# Patient Record
Sex: FEMALE | Race: ASIAN | NOT HISPANIC OR LATINO | URBAN - METROPOLITAN AREA
[De-identification: names, ages, dates, MRNs, and addresses within clinical notes are randomized per-mention and may not be internally consistent; named-entity substitution may affect disease eponyms.]

---

## 2021-09-08 ENCOUNTER — OFFICE VISIT - RIVER FALLS (OUTPATIENT)
Dept: FAMILY MEDICINE | Facility: CLINIC | Age: 26
End: 2021-09-08

## 2021-09-08 ASSESSMENT — MIFFLIN-ST. JEOR: SCORE: 1290.66

## 2022-01-13 ENCOUNTER — OFFICE VISIT - RIVER FALLS (OUTPATIENT)
Dept: FAMILY MEDICINE | Facility: CLINIC | Age: 27
End: 2022-01-13

## 2022-01-19 ENCOUNTER — COMMUNICATION - RIVER FALLS (OUTPATIENT)
Dept: FAMILY MEDICINE | Facility: CLINIC | Age: 27
End: 2022-01-19

## 2022-01-27 ENCOUNTER — COMMUNICATION - RIVER FALLS (OUTPATIENT)
Dept: FAMILY MEDICINE | Facility: CLINIC | Age: 27
End: 2022-01-27

## 2022-02-11 VITALS
SYSTOLIC BLOOD PRESSURE: 103 MMHG | BODY MASS INDEX: 22.32 KG/M2 | HEART RATE: 78 BPM | WEIGHT: 126 LBS | DIASTOLIC BLOOD PRESSURE: 68 MMHG | HEIGHT: 63 IN

## 2022-02-16 NOTE — NURSING NOTE
Depression Screening Entered On:  11/5/2021 2:24 PM CDT    Performed On:  9/8/2021 2:24 PM CDT by Rupal Freedman               Depression Screening   Little Interest - Pleasure in Activities :   Several days   Feeling Down, Depressed, Hopeless :   Several days   Initial Depression Screen Score :   2 Score   Poor Appetite or Overeating :   Not at all   Trouble Falling or Staying Asleep :   Several days   Feeling Tired or Little Energy :   Not at all   Feeling Bad About Yourself :   Not at all   Trouble Concentrating :   Not at all   Moving or Speaking Slowly :   Not at all   Thoughts Better Off Dead or Hurting Self :   Not at all   Difficulty at Work, Home, Getting Along :   Somewhat difficult   Detailed Depression Screen Score :   1    Total Depression Screen Score :   3    Rupal Freedman - 11/5/2021 2:24 PM CDT

## 2022-02-16 NOTE — PROGRESS NOTES
Patient:   SKY TUBBS            MRN: [id number]            FIN: [id number]               Age:   25 years     Sex:  Female     :  1995   Associated Diagnoses:   Skin rash; Foot pain   Author:   Quinton Jordan PA-C      Visit Information      Date of Service: 2021 01:52 pm  Performing Location: [facility]  Encounter#: [id number]      Primary Care Provider (PCP):  NONE ,       Referring Provider:  Quinton Jordan PA-C    NPI# [id number]   Visit type:  General concerns.    History limitation:  None.       Chief Complaint   2021 2:16 PM CDT     New Patient: Eczema on hands, face and scalp, foot pain        History of Present Illness             The patient presents with rash.  The location of the rash is bilaterally on the, scalp, face, arms, finger, feet.  The rash is described as scaly and itching.  The severity of the symptom(s) associated to the rash is mild.  The timing/ course of symptom(s) related to the rash is intermittent.  The rash has lasted for Dx with eczema. Using triamcinolone and moisturizers. Having a flare. Bilateral foot pain. Hurts on the balls of feet. Better now that she is wearing shoes. No injury. History of plantar fasciitis..        Review of Systems   Constitutional:  Negative.    Eye:  Negative.    Ear/Nose/Mouth/Throat:  Negative.    Respiratory:  Negative.    Cardiovascular:  Negative.    Gastrointestinal:  Negative.    Genitourinary:  Negative.    Hematology/Lymphatics:  Negative.    Endocrine:  Negative.    Immunologic:  Negative.    Musculoskeletal:  Negative except as documented in history of present illness.    Integumentary:  Negative except as documented in history of present illness.    Neurologic:  Negative.       Health Status   Allergies:    Allergic Reactions (Selected)  Severity Not Documented  Penicillin (No reactions were documented)   Medications:  (Selected)   Prescriptions  Prescribed  fluocinolone 0.01% topical solution: See Instructions,  Instructions: Topical tid for dry scalp, # 60 mL, 1 Refill(s), Type: Maintenance, Pharmacy: [facility], Topical tid for dry scalp, 63, in, 09/08/21 14:16:00 CDT, Height Measured, 126, lb, 09/08/21 14:16:00 CD...  fluocinolone 0.025% topical ointment: 1 esme, Topical, bid, # 30 gm, 0 Refill(s), Type: Maintenance, Pharmacy: [facility], For eczema. Not for use on face, 1 esme Topical bid, 63, in, 09/08/21 14:16:00 CDT, Height Measured, 126, lb, 09/08/21 14:16:00 CDT, Weight Ester...  hydrocortisone 2.5% topical cream: 1 esme, Topical, bid, # 20 gm, 0 Refill(s), Type: Maintenance, Pharmacy: [facility], For use on face, 1 esme Topical bid, 63, in, 09/08/21 14:16:00 CDT, Height Measured, 126, lb, 09/08/21 14:16:00 CDT, Weight Measured,    Medications          *denotes recorded medication          fluocinolone 0.01% topical solution: See Instructions, Topical tid for dry scalp, 60 mL, 1 Refill(s).          fluocinolone 0.025% topical ointment: 1 esme, Topical, bid, 30 gm, 0 Refill(s).          hydrocortisone 2.5% topical cream: 1 esme, Topical, bid, 20 gm, 0 Refill(s).          Histories   Past Medical History:    No active or resolved past medical history items have been selected or recorded.   Family History:    No family history items have been selected or recorded.   Procedure history:    No active procedure history items have been selected or recorded.   Social History:        Electronic Cigarette/Vaping Assessment            Electronic Cigarette Use: Never.      Tobacco Assessment            Never (less than 100 in lifetime)        Physical Examination   Vital Signs   9/8/2021 2:16 PM CDT Peripheral Pulse Rate 78 bpm    HR Method Electronic    Systolic Blood Pressure 103 mmHg    Diastolic Blood Pressure 68 mmHg    Mean Arterial Pressure 80 mmHg    BP Site Right arm    BP Method Electronic      Measurements from flowsheet : Measurements   9/8/2021 2:16  PM CDT Height Measured - Standard 63 in    Weight Measured - Standard 126.0 lb    BSA 1.59 m2    Body Mass Index 22.32 kg/m2      Musculoskeletal:  Normal range of motion, No tenderness, No swelling, No deformity, Normal gait.    Integumentary:  Dryness to elbows, hands, fingers, toes. Dry patch on occiput, base of hair line. Mild plaque ?suspicious for psoriasis..       Impression and Plan   Diagnosis     Skin rash ([id number] R21).     Foot pain (GID47-AB M79.673).     Patient Instructions:       Counseled: Patient, Regarding diagnosis, Regarding medications, Activity, Verbalized understanding.    Summary:  Tylenol, continue supportive shoes. FU if not improved. Gave detailed instructions for medications and that only the hydrocortisone can be used on the face. All of them for one week on, one week off. Discussed the chronicity of eczema. Limit soaps. Hydrate and moisturize..    Orders     Orders (Selected)   Prescriptions  Prescribed  fluocinolone 0.01% topical solution: See Instructions, Instructions: Topical tid for dry scalp, # 60 mL, 1 Refill(s), Type: Maintenance, Pharmacy: [facility], Topical tid for dry scalp, 63, in, 09/08/21 14:16:00 CDT, Height Measured, 126, lb, 09/08/21 14:16:00 CD...  fluocinolone 0.025% topical ointment: 1 esme, Topical, bid, # 30 gm, 0 Refill(s), Type: Maintenance, Pharmacy: [facility], For eczema. Not for use on face, 1 esme Topical bid, 63, in, 09/08/21 14:16:00 CDT, Height Measured, 126, lb, 09/08/21 14:16:00 CDT, Weight Ester...  hydrocortisone 2.5% topical cream: 1 esme, Topical, bid, # 20 gm, 0 Refill(s), Type: Maintenance, Pharmacy: [facility], For use on face, 1 esme Topical bid, 63, in, 09/08/21 14:16:00 CDT, Height Measured, 126, lb, 09/08/21 14:16:00 CDT, Weight Measured.

## 2022-02-16 NOTE — NURSING NOTE
Comprehensive Intake Entered On:  9/8/2021 2:22 PM CDT    Performed On:  9/8/2021 2:16 PM CDT by Sommer Padilla CMA               Summary   Chief Complaint :   New Patient: Eczema on hands, face and scalp, foot pain   Weight Measured :   126.0 lb(Converted to: 126 lb 0 oz, 57.153 kg)    Height Measured :   63 in(Converted to: 5 ft 3 in, 160.02 cm)    Body Mass Index :   22.32 kg/m2   Body Surface Area :   1.59 m2   Systolic Blood Pressure :   103 mmHg   Diastolic Blood Pressure :   68 mmHg   Mean Arterial Pressure :   80 mmHg   Peripheral Pulse Rate :   78 bpm   BP Site :   Right arm   BP Method :   Electronic   HR Method :   Electronic   Sommer Padilla CMA - 9/8/2021 2:16 PM CDT   Health Status   Tobacco Use? :   Never smoker   Sommer Padilla CMA - 9/8/2021 2:16 PM CDT   Consents   Consent for Immunization Exchange :   Consent Granted   Consent for Immunizations to Providers :   Consent Granted   Sommer Padilla CMA - 9/8/2021 2:16 PM CDT   Problems   (As Of: 9/8/2021 2:22:37 PM CDT)   Meds / Allergies   (As Of: 9/8/2021 2:22:37 PM CDT)   Allergies (Active)   penicillin  Estimated Onset Date:   Unspecified ; Created By:   Sommer Padilla CMA; Reaction Status:   Active ; Category:   Drug ; Substance:   penicillin ; Type:   Allergy ; Updated By:   Sommer Padilla CMA; Reviewed Date:   9/8/2021 2:20 PM CDT        Medication List   (As Of: 9/8/2021 2:22:37 PM CDT)        Social History   Social History   (As Of: 9/8/2021 2:22:37 PM CDT)   Tobacco:        Never (less than 100 in lifetime)   (Last Updated: 9/8/2021 2:20:22 PM CDT by Sommer Padilla CMA)          Electronic Cigarette/Vaping:        Electronic Cigarette Use: Never.   (Last Updated: 9/8/2021 2:20:25 PM CDT by Sommer Padilla CMA)

## 2022-03-02 VITALS
HEIGHT: 63 IN | DIASTOLIC BLOOD PRESSURE: 62 MMHG | TEMPERATURE: 98.7 F | SYSTOLIC BLOOD PRESSURE: 112 MMHG | WEIGHT: 133 LBS | BODY MASS INDEX: 23.57 KG/M2 | HEART RATE: 74 BPM

## 2022-03-02 NOTE — TELEPHONE ENCOUNTER
---------------------  From: Nallely Quigley RN (Phone Messages Pool ([id number]_[facility]))   Sent: 1/19/2022 8:29:16 AM CST  Subject: General Message       PCP:   LAWANDA     Time of Call:  8:10am       Person Calling:  pt  Phone number:  [phone]    Returned call at: 8:20am    Note:   VM received from pt, stating she took Clindamycin and now is having stomach pain. Requested return call.  TC with pt, who stated she did not have food with the ABX. Advised to take ABX with food. Encouraged to take crackers and broth/tea now. Pt stated she has not had this since taking the ABX about 7 days ago. Pt asked about probiotics and if recommend taking. Explained ABX kill bacteria good ones and bad ones; yogurt has live cultures and may be beneficial. Pt verbalized understanding and will take ABX with food.     Last office visit and reason: 1/13/22 jaw pain ZIM

## 2022-03-02 NOTE — PROGRESS NOTES
Chief Complaint    sore throat and ear on right side. Negative covid test on 01/7/22.  History of Present Illness       Patient is here because of some tenderness in her right throat underneath the jaw.  Is been present for several days.  Hurts if she tries to open her mouth too wide.  She has not had tooth pain.  No fever or chills.  She did have some dental work a month ago but thinks it went well.       Patient was living in [place].  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  She said it was benign but cannot remember the name of it.  But she was told she was due to have follow-up MRI done in January of this year.  Review of Systems       No fever, no chills  Physical Exam   Vitals & Measurements    T: 98.7  F (Tympanic)  HR: 74 (Peripheral)  BP: 112/62     HT: 63 in  WT: 133 lb  BMI: 23.56        There is no tenderness over the right TMJ.       She is able to open her mouth although at the extremes she complains of discomfort       Is actually more tender underneath the angle of the jaw and there is slight swelling       Oropharynx looks unremarkable  Assessment/Plan       1. Jaw pain (R68.84)          She seems to have some adenopathy or salivary gland irritation.  I recommend antibiotics.  Warm compresses may help.  If it continues we may need to do further evaluation such as dental appointment       2. History of spinal surgery (Z98.890)          We have requested the records but she clear about having an MRI of her cervical spine so I have ordered it anticipating her records will be here to have further help us make decisions  Patient Information     Name:SKY TUBBS      Address:      [address] [place] [address] [address] [place] [id number]     Sex:Female     YOB: 1995     [phone]     Emergency Contact:[facility], CONTACT     MRN:[id number]     FIN:[id number]     Location:[facility]     Date of Service:01/13/2022      Primary Care Physician:       NONE  [facility]      Attending Physician:       Jamison Bob MD, [phone]  Problem List/Past Medical History    Ongoing     No qualifying data    Historical     No qualifying data  Medications    clindamycin 300 mg oral capsule, 300 mg= 1 cap(s), Oral, q6 hrs    fluocinolone 0.01% topical solution, See Instructions, 1 refills    fluocinolone 0.025% topical ointment, 1 esme, Topical, bid    hydrocortisone 2.5% topical cream, 1 esme, Topical, bid  Allergies    penicillin  Social History    Smoking Status     Never smoker     Electronic Cigarette/Vaping      Electronic Cigarette Use: Never.     Tobacco      Never (less than 100 in lifetime)  Family History    CA - Lung cancer: Grandfather (M).    High blood pressure: Grandfather (P).    High cholesterol: Grandfather (P).  Immunizations          Scheduled Immunizations          Dose Date(s)          human papillomavirus vaccine          05/18/2018, 07/29/2019          SARS-CoV-2 (COVID-19) Pfizer-162b2          08/03/2021, 08/24/2021

## 2022-03-02 NOTE — NURSING NOTE
Comprehensive Intake Entered On:  1/13/2022 4:44 PM CST    Performed On:  1/13/2022 4:39 PM CST by Sommer Mayberry LPN               Summary   Chief Complaint :   sore throat and ear on right side. Negative covid test on 01/7/22.   Weight Measured :   133 lb(Converted to: 133 lb 0 oz, 60.328 kg)    Height Measured :   63 in(Converted to: 5 ft 3 in, 160.02 cm)    Body Mass Index :   23.56 kg/m2   Body Surface Area :   1.64 m2   Systolic Blood Pressure :   112 mmHg   Diastolic Blood Pressure :   62 mmHg   Mean Arterial Pressure :   79 mmHg   Peripheral Pulse Rate :   74 bpm   BP Site :   Right arm   Pulse Site :   Radial artery   BP Method :   Manual   HR Method :   Manual   Temperature Tympanic :   98.7 DegF(Converted to: 37.1 DegC)    Sommer Mayberry LPN - 1/13/2022 4:39 PM CST   Health Status   Allergies Verified? :   Yes   Medication History Verified? :   Yes   Pre-Visit Planning Status :   Completed   Sommer Mayberry LPN - 1/13/2022 4:39 PM CST   Consents   Consent for Immunization Exchange :   Consent Granted   Consent for Immunizations to Providers :   Consent Granted   Sommer Mayberry LPN - 1/13/2022 4:39 PM CST   Meds / Allergies   (As Of: 1/13/2022 4:44:47 PM CST)   Allergies (Active)   penicillin  Estimated Onset Date:   Unspecified ; Created By:   Sommer Padilla CMA; Reaction Status:   Active ; Category:   Drug ; Substance:   penicillin ; Type:   Allergy ; Updated By:   Sommer Padilla CMA; Reviewed Date:   9/8/2021 2:34 PM CDT        Medication List   (As Of: 1/13/2022 4:44:47 PM CST)   Prescription/Discharge Order    fluocinolone topical  :   fluocinolone topical ; Status:   Prescribed ; Ordered As Mnemonic:   fluocinolone 0.01% topical solution ; Simple Display Line:   See Instructions, Topical tid for dry scalp, 60 mL, 1 Refill(s) ; Ordering Provider:   Quinton Jordan PA-C; Catalog Code:   fluocinolone topical ; Order Dt/Tm:   9/8/2021 2:32:22 PM CDT          fluocinolone topical  :    fluocinolone topical ; Status:   Prescribed ; Ordered As Mnemonic:   fluocinolone 0.025% topical ointment ; Simple Display Line:   1 esme, Topical, bid, 30 gm, 0 Refill(s) ; Ordering Provider:   Quinton Jordan PA-C; Catalog Code:   fluocinolone topical ; Order Dt/Tm:   9/8/2021 2:32:31 PM CDT          hydrocortisone topical  :   hydrocortisone topical ; Status:   Prescribed ; Ordered As Mnemonic:   hydrocortisone 2.5% topical cream ; Simple Display Line:   1 esme, Topical, bid, 20 gm, 0 Refill(s) ; Ordering Provider:   Quinton Jordan PA-C; Catalog Code:   hydrocortisone topical ; Order Dt/Tm:   9/8/2021 2:32:42 PM CDT

## 2022-03-02 NOTE — TELEPHONE ENCOUNTER
---------------------  From: Nallely Quigley RN (Phone Messages Pool ([id number]_[facility]))   To: [facility] Message Pool ([id number]_[place] [facility] [place]);     Sent: 2022 3:34:06 PM CST  Subject: MRI results       PCP:  none, ZIM      Time of Call:  3:28pm       Person Calling:  pt  Phone number:  [phone]    Note:   Pt called in, requesting to discuss MRI results and what they mean. Advised pt to schedule phone visit to discuss her questions; pt stated her insurance has  and at this time has no coverage. Pt inquiring if LAWANDA would be willing to call her to talk about results.    Please advise.     Last office visit and reason:  22 jaw pain LAWANDA---------------------  From: Sommer Mayberry LPN ([facility] Message Pool ([id number]_[facility]))   To: Jamison Bob MD;     Sent: 2022 4:53:06 PM CST  Subject: FW: MRI results---------------------  From: Jamison Bob MD   To: [facility] Message Pool ([id number]_[place] [facility] [place]);     Sent: 2022 5:51:11 PM CST  Subject: RE: MRI results     called

## 2022-03-02 NOTE — LETTER
(Inserted Image. Unable to display)   [address] [place] [address]  January 19, 2022      SKY TUBBS  [address]  [place] [address]        Dear SKY,      Thank you for selecting [facility] for your healthcare needs.       The MRI does show surgical changes but findings do not indicate any new issues          Please contact me or my assistant at [phone] if you have any questions or concerns.     Sincerely,        Jamison Bob MD